# Patient Record
Sex: MALE | Race: WHITE
[De-identification: names, ages, dates, MRNs, and addresses within clinical notes are randomized per-mention and may not be internally consistent; named-entity substitution may affect disease eponyms.]

---

## 2021-08-20 ENCOUNTER — HOSPITAL ENCOUNTER (EMERGENCY)
Dept: HOSPITAL 96 - M.ERS | Age: 61
Discharge: HOME | End: 2021-08-20
Payer: OTHER GOVERNMENT

## 2021-08-20 VITALS — HEIGHT: 75 IN | WEIGHT: 217 LBS | BODY MASS INDEX: 26.98 KG/M2

## 2021-08-20 VITALS — SYSTOLIC BLOOD PRESSURE: 154 MMHG | DIASTOLIC BLOOD PRESSURE: 86 MMHG

## 2021-08-20 DIAGNOSIS — R07.89: ICD-10-CM

## 2021-08-20 DIAGNOSIS — I71.2: Primary | ICD-10-CM

## 2021-08-20 DIAGNOSIS — I10: ICD-10-CM

## 2021-08-20 LAB
ABSOLUTE BASOPHILS: 0.2 THOU/UL (ref 0–0.2)
ABSOLUTE EOSINOPHILS: 0.2 THOU/UL (ref 0–0.7)
ABSOLUTE MONOCYTES: 1.5 THOU/UL (ref 0–1.2)
ALBUMIN SERPL-MCNC: 3.9 G/DL (ref 3.4–5)
ALP SERPL-CCNC: 58 U/L (ref 46–116)
ALT SERPL-CCNC: 22 U/L (ref 30–65)
ANION GAP SERPL CALC-SCNC: 10 MMOL/L (ref 7–16)
AST SERPL-CCNC: 17 U/L (ref 15–37)
BASOPHILS NFR BLD AUTO: 1.3 %
BILIRUB SERPL-MCNC: 0.5 MG/DL
BUN SERPL-MCNC: 19 MG/DL (ref 7–18)
CALCIUM SERPL-MCNC: 8.5 MG/DL (ref 8.5–10.1)
CHLORIDE SERPL-SCNC: 100 MMOL/L (ref 98–107)
CO2 SERPL-SCNC: 26 MMOL/L (ref 21–32)
CREAT SERPL-MCNC: 1.1 MG/DL (ref 0.6–1.3)
EOSINOPHIL NFR BLD: 1.5 %
GLUCOSE SERPL-MCNC: 88 MG/DL (ref 70–99)
GRANULOCYTES NFR BLD MANUAL: 70.7 %
HCT VFR BLD CALC: 43.2 % (ref 42–52)
HGB BLD-MCNC: 14.3 GM/DL (ref 14–18)
LIPASE: 127 U/L (ref 73–393)
LYMPHOCYTES # BLD: 2.5 THOU/UL (ref 0.8–5.3)
LYMPHOCYTES NFR BLD AUTO: 16.7 %
MAGNESIUM SERPL-MCNC: 1.8 MG/DL (ref 1.8–2.4)
MCH RBC QN AUTO: 30 PG (ref 26–34)
MCHC RBC AUTO-ENTMCNC: 33.1 G/DL (ref 28–37)
MCV RBC: 90.5 FL (ref 80–100)
MONOCYTES NFR BLD: 9.8 %
MPV: 9 FL. (ref 7.2–11.1)
NEUTROPHILS # BLD: 10.6 THOU/UL (ref 1.6–8.1)
NUCLEATED RBCS: 0 /100WBC
PLATELET COUNT*: 242 THOU/UL (ref 150–400)
POTASSIUM SERPL-SCNC: 3.8 MMOL/L (ref 3.5–5.1)
PROT SERPL-MCNC: 7.2 G/DL (ref 6.4–8.2)
RBC # BLD AUTO: 4.77 MIL/UL (ref 4.5–6)
RDW-CV: 13.8 % (ref 10.5–14.5)
SODIUM SERPL-SCNC: 136 MMOL/L (ref 136–145)
WBC # BLD AUTO: 14.9 THOU/UL (ref 4–11)

## 2021-08-20 NOTE — EKG
[place] [address]
Phone:  [phone]                     ELECTROCARDIOGRAM REPORT      
_______________________________________________________________________________
 
Name:         BLUEEMELIA                 Room:                     PRE ER 
M.R.#:    [id number]     Account #:     [id number]  
Admission:                Attend Phys:                     
Discharge:                Date of Birth: [birth year]  
Date of Service: 21  Report #:      [id number]
        [id number]
_______________________________________________________________________________
THIS REPORT FOR:  //name//                      
 
                         [facility] ED
                                       
Test Date:    2021               Test Time:    16:21:42
Pat Name:     EMELIA BLUE              Department:   
Patient ID:   [id number]            Room:          
Gender:       M                        Technician:   VIRGILIO
:          1960               Requested By: Kevon Lala
Order Number: [id number] MD:   Tre Garcia
                                 Measurements
Intervals                              Axis          
Rate:         81                       P:            45
CA:           184                      QRS:          6
QRSD:         93                       T:            40
QT:           394                                    
QTc:          458                                    
                           Interpretive Statements
Sinus rhythm
Probable left atrial enlargement
No previous ECG available for comparison
Electronically Signed On 2021 16:34:22 CDT by Tre Garcia
[email or web address]
 
 
 
 
 
 
 
 
 
 
 
 
 
 
 
 
 
 
 
 
 
  <ELECTRONICALLY SIGNED>
                                           By: Tre Garcia MD, [facility]      
  21     1634
D: 21   _____________________________________
T: 21 1621   Tre Garcia MD, FACC        /EPI

## 2021-08-21 NOTE — EKG
[place] [address]
Phone:  [phone]                     ELECTROCARDIOGRAM REPORT      
_______________________________________________________________________________
 
Name:         MIRZAEMELIA SAGAR                 Room:                     [facility]#:    [id number]     Account #:     [id number]  
Admission:    21    Attend Phys:                     
Discharge:    21    Date of Birth: [birth year]  
Date of Service: 21 182  Report #:      [id number]
        [id number]
_______________________________________________________________________________
THIS REPORT FOR:  //name//                      
 
                         [facility] ED
                                       
Test Date:    2021               Test Time:    18:24:58
Pat Name:     EMELIA BLUE              Department:   
Patient ID:   [id number]            Room:          
Gender:                               Technician:   
:          1960               Requested By: Kevon Lala
Order Number: [id number] MD:   Israel Huang
                                 Measurements
Intervals                              Axis          
Rate:         69                       P:            40
NM:           169                      QRS:          28
QRSD:         91                       T:            41
QT:           421                                    
QTc:          451                                    
                           Interpretive Statements
Sinus rhythm
Possible left atrial enlargement
Borderline ST elevation, lateral leads, early repolarization
Compared to ECG 2021 16:21:42
No significant difference noted
Electronically Signed On 2021 9:34:36 CDT by Israel Huang
[email or web address]
 
 
 
 
 
 
 
 
 
 
 
 
 
 
 
 
 
 
 
  <ELECTRONICALLY SIGNED>
                                           By: Israel Huang MD, FACC   
  21     0934
D: 21 1824   _____________________________________
T: 21 1824   Israel Huang MD, [facility]     /EPI